# Patient Record
Sex: FEMALE | Race: WHITE | Employment: FULL TIME | URBAN - METROPOLITAN AREA
[De-identification: names, ages, dates, MRNs, and addresses within clinical notes are randomized per-mention and may not be internally consistent; named-entity substitution may affect disease eponyms.]

---

## 2023-10-30 ENCOUNTER — HOSPITAL ENCOUNTER (OUTPATIENT)
Facility: HOSPITAL | Age: 28
Setting detail: OBSERVATION
Discharge: HOME | End: 2023-10-31
Attending: EMERGENCY MEDICINE | Admitting: OBSTETRICS & GYNECOLOGY
Payer: COMMERCIAL

## 2023-10-30 ENCOUNTER — APPOINTMENT (OUTPATIENT)
Facility: HOSPITAL | Age: 28
End: 2023-10-30
Payer: COMMERCIAL

## 2023-10-30 ENCOUNTER — ANESTHESIA EVENT (OUTPATIENT)
Facility: HOSPITAL | Age: 28
End: 2023-10-30
Payer: COMMERCIAL

## 2023-10-30 ENCOUNTER — ANESTHESIA (OUTPATIENT)
Facility: HOSPITAL | Age: 28
End: 2023-10-30
Payer: COMMERCIAL

## 2023-10-30 VITALS
SYSTOLIC BLOOD PRESSURE: 115 MMHG | HEART RATE: 72 BPM | WEIGHT: 162.48 LBS | RESPIRATION RATE: 12 BRPM | BODY MASS INDEX: 23.26 KG/M2 | OXYGEN SATURATION: 100 % | HEIGHT: 70 IN | TEMPERATURE: 97.8 F | DIASTOLIC BLOOD PRESSURE: 75 MMHG

## 2023-10-30 DIAGNOSIS — R10.32 LEFT LOWER QUADRANT ABDOMINAL PAIN: ICD-10-CM

## 2023-10-30 DIAGNOSIS — N83.512 TORSION OF LEFT OVARY: Primary | ICD-10-CM

## 2023-10-30 DIAGNOSIS — Z98.890 STATUS POST LAPAROSCOPY: ICD-10-CM

## 2023-10-30 LAB
ALBUMIN SERPL-MCNC: 4.3 G/DL (ref 3.5–5)
ALBUMIN/GLOB SERPL: 1.3 (ref 1.1–2.2)
ALP SERPL-CCNC: 50 U/L (ref 45–117)
ALT SERPL-CCNC: 16 U/L (ref 12–78)
ANION GAP SERPL CALC-SCNC: 7 MMOL/L (ref 5–15)
APPEARANCE UR: CLEAR
AST SERPL-CCNC: 14 U/L (ref 15–37)
BACTERIA URNS QL MICRO: NEGATIVE /HPF
BASOPHILS # BLD: 0 K/UL (ref 0–0.1)
BASOPHILS NFR BLD: 0 % (ref 0–1)
BILIRUB SERPL-MCNC: 0.6 MG/DL (ref 0.2–1)
BILIRUB UR QL: NEGATIVE
BUN SERPL-MCNC: 15 MG/DL (ref 6–20)
BUN/CREAT SERPL: 15 (ref 12–20)
CALCIUM SERPL-MCNC: 9 MG/DL (ref 8.5–10.1)
CHLORIDE SERPL-SCNC: 108 MMOL/L (ref 97–108)
CO2 SERPL-SCNC: 20 MMOL/L (ref 21–32)
COLOR UR: ABNORMAL
COMMENT:: NORMAL
CREAT SERPL-MCNC: 1.02 MG/DL (ref 0.55–1.02)
DIFFERENTIAL METHOD BLD: NORMAL
EOSINOPHIL # BLD: 0.1 K/UL (ref 0–0.4)
EOSINOPHIL NFR BLD: 1 % (ref 0–7)
EPITH CASTS URNS QL MICRO: ABNORMAL /LPF
ERYTHROCYTE [DISTWIDTH] IN BLOOD BY AUTOMATED COUNT: 11.8 % (ref 11.5–14.5)
GLOBULIN SER CALC-MCNC: 3.3 G/DL (ref 2–4)
GLUCOSE SERPL-MCNC: 122 MG/DL (ref 65–100)
GLUCOSE UR STRIP.AUTO-MCNC: NEGATIVE MG/DL
HCG SERPL QL: NEGATIVE
HCG UR QL: NEGATIVE
HCT VFR BLD AUTO: 39.5 % (ref 35–47)
HGB BLD-MCNC: 13.8 G/DL (ref 11.5–16)
HGB UR QL STRIP: ABNORMAL
HYALINE CASTS URNS QL MICRO: ABNORMAL /LPF (ref 0–5)
IMM GRANULOCYTES # BLD AUTO: 0 K/UL (ref 0–0.04)
IMM GRANULOCYTES NFR BLD AUTO: 0 % (ref 0–0.5)
KETONES UR QL STRIP.AUTO: ABNORMAL MG/DL
LEUKOCYTE ESTERASE UR QL STRIP.AUTO: NEGATIVE
LIPASE SERPL-CCNC: 40 U/L (ref 13–75)
LYMPHOCYTES # BLD: 2.7 K/UL (ref 0.8–3.5)
LYMPHOCYTES NFR BLD: 26 % (ref 12–49)
MCH RBC QN AUTO: 29.9 PG (ref 26–34)
MCHC RBC AUTO-ENTMCNC: 34.9 G/DL (ref 30–36.5)
MCV RBC AUTO: 85.7 FL (ref 80–99)
MONOCYTES # BLD: 0.6 K/UL (ref 0–1)
MONOCYTES NFR BLD: 6 % (ref 5–13)
NEUTS SEG # BLD: 7.2 K/UL (ref 1.8–8)
NEUTS SEG NFR BLD: 67 % (ref 32–75)
NITRITE UR QL STRIP.AUTO: NEGATIVE
NRBC # BLD: 0 K/UL (ref 0–0.01)
NRBC BLD-RTO: 0 PER 100 WBC
PH UR STRIP: 5.5 (ref 5–8)
PLATELET # BLD AUTO: 237 K/UL (ref 150–400)
PMV BLD AUTO: 9.4 FL (ref 8.9–12.9)
POTASSIUM SERPL-SCNC: 3.1 MMOL/L (ref 3.5–5.1)
PROT SERPL-MCNC: 7.6 G/DL (ref 6.4–8.2)
PROT UR STRIP-MCNC: NEGATIVE MG/DL
RBC # BLD AUTO: 4.61 M/UL (ref 3.8–5.2)
RBC #/AREA URNS HPF: ABNORMAL /HPF (ref 0–5)
SODIUM SERPL-SCNC: 135 MMOL/L (ref 136–145)
SP GR UR REFRACTOMETRY: 1.02 (ref 1–1.03)
SPECIMEN HOLD: NORMAL
SPECIMEN HOLD: NORMAL
UROBILINOGEN UR QL STRIP.AUTO: 0.2 EU/DL (ref 0.2–1)
WBC # BLD AUTO: 10.6 K/UL (ref 3.6–11)
WBC URNS QL MICRO: ABNORMAL /HPF (ref 0–4)

## 2023-10-30 PROCEDURE — 80053 COMPREHEN METABOLIC PANEL: CPT

## 2023-10-30 PROCEDURE — 7100000001 HC PACU RECOVERY - ADDTL 15 MIN: Performed by: OBSTETRICS & GYNECOLOGY

## 2023-10-30 PROCEDURE — 96374 THER/PROPH/DIAG INJ IV PUSH: CPT

## 2023-10-30 PROCEDURE — G0378 HOSPITAL OBSERVATION PER HR: HCPCS

## 2023-10-30 PROCEDURE — 83690 ASSAY OF LIPASE: CPT

## 2023-10-30 PROCEDURE — 96375 TX/PRO/DX INJ NEW DRUG ADDON: CPT

## 2023-10-30 PROCEDURE — 36415 COLL VENOUS BLD VENIPUNCTURE: CPT

## 2023-10-30 PROCEDURE — 81001 URINALYSIS AUTO W/SCOPE: CPT

## 2023-10-30 PROCEDURE — 6360000002 HC RX W HCPCS: Performed by: STUDENT IN AN ORGANIZED HEALTH CARE EDUCATION/TRAINING PROGRAM

## 2023-10-30 PROCEDURE — 6360000002 HC RX W HCPCS

## 2023-10-30 PROCEDURE — 6360000002 HC RX W HCPCS: Performed by: NURSE ANESTHETIST, CERTIFIED REGISTERED

## 2023-10-30 PROCEDURE — 2709999900 HC NON-CHARGEABLE SUPPLY: Performed by: OBSTETRICS & GYNECOLOGY

## 2023-10-30 PROCEDURE — 2500000003 HC RX 250 WO HCPCS: Performed by: NURSE ANESTHETIST, CERTIFIED REGISTERED

## 2023-10-30 PROCEDURE — 81025 URINE PREGNANCY TEST: CPT

## 2023-10-30 PROCEDURE — 6360000002 HC RX W HCPCS: Performed by: OBSTETRICS & GYNECOLOGY

## 2023-10-30 PROCEDURE — 99285 EMERGENCY DEPT VISIT HI MDM: CPT

## 2023-10-30 PROCEDURE — 7100000000 HC PACU RECOVERY - FIRST 15 MIN: Performed by: OBSTETRICS & GYNECOLOGY

## 2023-10-30 PROCEDURE — 84703 CHORIONIC GONADOTROPIN ASSAY: CPT

## 2023-10-30 PROCEDURE — 85025 COMPLETE CBC W/AUTO DIFF WBC: CPT

## 2023-10-30 PROCEDURE — 3700000000 HC ANESTHESIA ATTENDED CARE: Performed by: OBSTETRICS & GYNECOLOGY

## 2023-10-30 PROCEDURE — 3700000001 HC ADD 15 MINUTES (ANESTHESIA): Performed by: OBSTETRICS & GYNECOLOGY

## 2023-10-30 PROCEDURE — 3600000014 HC SURGERY LEVEL 4 ADDTL 15MIN: Performed by: OBSTETRICS & GYNECOLOGY

## 2023-10-30 PROCEDURE — 2580000003 HC RX 258: Performed by: OBSTETRICS & GYNECOLOGY

## 2023-10-30 PROCEDURE — 76830 TRANSVAGINAL US NON-OB: CPT

## 2023-10-30 PROCEDURE — 76856 US EXAM PELVIC COMPLETE: CPT

## 2023-10-30 PROCEDURE — 2580000003 HC RX 258: Performed by: NURSE ANESTHETIST, CERTIFIED REGISTERED

## 2023-10-30 PROCEDURE — 3600000004 HC SURGERY LEVEL 4 BASE: Performed by: OBSTETRICS & GYNECOLOGY

## 2023-10-30 RX ORDER — SODIUM CHLORIDE, SODIUM LACTATE, POTASSIUM CHLORIDE, CALCIUM CHLORIDE 600; 310; 30; 20 MG/100ML; MG/100ML; MG/100ML; MG/100ML
INJECTION, SOLUTION INTRAVENOUS CONTINUOUS PRN
Status: DISCONTINUED | OUTPATIENT
Start: 2023-10-30 | End: 2023-10-30 | Stop reason: SDUPTHER

## 2023-10-30 RX ORDER — FENTANYL CITRATE 50 UG/ML
50 INJECTION, SOLUTION INTRAMUSCULAR; INTRAVENOUS
Status: DISCONTINUED | OUTPATIENT
Start: 2023-10-30 | End: 2023-10-30 | Stop reason: HOSPADM

## 2023-10-30 RX ORDER — SODIUM CHLORIDE, SODIUM LACTATE, POTASSIUM CHLORIDE, CALCIUM CHLORIDE 600; 310; 30; 20 MG/100ML; MG/100ML; MG/100ML; MG/100ML
INJECTION, SOLUTION INTRAVENOUS CONTINUOUS
Status: DISCONTINUED | OUTPATIENT
Start: 2023-10-30 | End: 2023-10-30 | Stop reason: HOSPADM

## 2023-10-30 RX ORDER — SODIUM CHLORIDE 0.9 % (FLUSH) 0.9 %
5-40 SYRINGE (ML) INJECTION EVERY 12 HOURS SCHEDULED
Status: DISCONTINUED | OUTPATIENT
Start: 2023-10-30 | End: 2023-11-01 | Stop reason: HOSPADM

## 2023-10-30 RX ORDER — FENTANYL CITRATE 50 UG/ML
INJECTION, SOLUTION INTRAMUSCULAR; INTRAVENOUS PRN
Status: DISCONTINUED | OUTPATIENT
Start: 2023-10-30 | End: 2023-10-30 | Stop reason: SDUPTHER

## 2023-10-30 RX ORDER — SODIUM CHLORIDE 9 MG/ML
INJECTION, SOLUTION INTRAVENOUS PRN
Status: DISCONTINUED | OUTPATIENT
Start: 2023-10-30 | End: 2023-11-01 | Stop reason: HOSPADM

## 2023-10-30 RX ORDER — LABETALOL HYDROCHLORIDE 5 MG/ML
10 INJECTION, SOLUTION INTRAVENOUS
Status: DISCONTINUED | OUTPATIENT
Start: 2023-10-30 | End: 2023-11-01 | Stop reason: HOSPADM

## 2023-10-30 RX ORDER — MEPERIDINE HYDROCHLORIDE 25 MG/ML
12.5 INJECTION INTRAMUSCULAR; INTRAVENOUS; SUBCUTANEOUS EVERY 5 MIN PRN
Status: DISCONTINUED | OUTPATIENT
Start: 2023-10-30 | End: 2023-11-01 | Stop reason: HOSPADM

## 2023-10-30 RX ORDER — BUPIVACAINE HYDROCHLORIDE 5 MG/ML
INJECTION, SOLUTION EPIDURAL; INTRACAUDAL PRN
Status: DISCONTINUED | OUTPATIENT
Start: 2023-10-30 | End: 2023-10-30 | Stop reason: HOSPADM

## 2023-10-30 RX ORDER — SODIUM CHLORIDE 0.9 % (FLUSH) 0.9 %
5-40 SYRINGE (ML) INJECTION EVERY 12 HOURS SCHEDULED
Status: DISCONTINUED | OUTPATIENT
Start: 2023-10-30 | End: 2023-10-30 | Stop reason: HOSPADM

## 2023-10-30 RX ORDER — LANOLIN ALCOHOL/MO/W.PET/CERES
300 CREAM (GRAM) TOPICAL DAILY
COMMUNITY

## 2023-10-30 RX ORDER — SODIUM CHLORIDE 0.9 % (FLUSH) 0.9 %
5-40 SYRINGE (ML) INJECTION PRN
Status: DISCONTINUED | OUTPATIENT
Start: 2023-10-30 | End: 2023-11-01 | Stop reason: HOSPADM

## 2023-10-30 RX ORDER — OXYCODONE HYDROCHLORIDE 5 MG/1
5 TABLET ORAL PRN
Status: ACTIVE | OUTPATIENT
Start: 2023-10-30 | End: 2023-10-30

## 2023-10-30 RX ORDER — SUCCINYLCHOLINE/SOD CL,ISO/PF 200MG/10ML
SYRINGE (ML) INTRAVENOUS PRN
Status: DISCONTINUED | OUTPATIENT
Start: 2023-10-30 | End: 2023-10-30 | Stop reason: SDUPTHER

## 2023-10-30 RX ORDER — LIDOCAINE HYDROCHLORIDE 20 MG/ML
INJECTION, SOLUTION EPIDURAL; INFILTRATION; INTRACAUDAL; PERINEURAL PRN
Status: DISCONTINUED | OUTPATIENT
Start: 2023-10-30 | End: 2023-10-30 | Stop reason: SDUPTHER

## 2023-10-30 RX ORDER — IBUPROFEN 800 MG/1
800 TABLET ORAL EVERY 8 HOURS PRN
Qty: 30 TABLET | Refills: 1 | Status: SHIPPED | OUTPATIENT
Start: 2023-10-30

## 2023-10-30 RX ORDER — MIDAZOLAM HYDROCHLORIDE 2 MG/2ML
2 INJECTION, SOLUTION INTRAMUSCULAR; INTRAVENOUS
Status: DISCONTINUED | OUTPATIENT
Start: 2023-10-30 | End: 2023-10-30 | Stop reason: HOSPADM

## 2023-10-30 RX ORDER — OXYCODONE HYDROCHLORIDE AND ACETAMINOPHEN 5; 325 MG/1; MG/1
1 TABLET ORAL EVERY 6 HOURS PRN
Qty: 28 TABLET | Refills: 0 | Status: SHIPPED | OUTPATIENT
Start: 2023-10-30 | End: 2023-11-06

## 2023-10-30 RX ORDER — ALBUTEROL SULFATE 2.5 MG/3ML
2.5 SOLUTION RESPIRATORY (INHALATION)
Status: DISCONTINUED | OUTPATIENT
Start: 2023-10-30 | End: 2023-10-30 | Stop reason: HOSPADM

## 2023-10-30 RX ORDER — ONDANSETRON 2 MG/ML
4 INJECTION INTRAMUSCULAR; INTRAVENOUS
Status: ACTIVE | OUTPATIENT
Start: 2023-10-30 | End: 2023-10-31

## 2023-10-30 RX ORDER — HYDROMORPHONE HYDROCHLORIDE 1 MG/ML
0.5 INJECTION, SOLUTION INTRAMUSCULAR; INTRAVENOUS; SUBCUTANEOUS ONCE
Status: COMPLETED | OUTPATIENT
Start: 2023-10-30 | End: 2023-10-30

## 2023-10-30 RX ORDER — PROCHLORPERAZINE EDISYLATE 5 MG/ML
5 INJECTION INTRAMUSCULAR; INTRAVENOUS
Status: ACTIVE | OUTPATIENT
Start: 2023-10-30 | End: 2023-10-31

## 2023-10-30 RX ORDER — OXYCODONE HYDROCHLORIDE 5 MG/1
10 TABLET ORAL PRN
Status: ACTIVE | OUTPATIENT
Start: 2023-10-30 | End: 2023-10-30

## 2023-10-30 RX ORDER — KETOROLAC TROMETHAMINE 30 MG/ML
INJECTION, SOLUTION INTRAMUSCULAR; INTRAVENOUS PRN
Status: DISCONTINUED | OUTPATIENT
Start: 2023-10-30 | End: 2023-10-30 | Stop reason: SDUPTHER

## 2023-10-30 RX ORDER — ROCURONIUM BROMIDE 10 MG/ML
INJECTION, SOLUTION INTRAVENOUS PRN
Status: DISCONTINUED | OUTPATIENT
Start: 2023-10-30 | End: 2023-10-30 | Stop reason: SDUPTHER

## 2023-10-30 RX ORDER — FENTANYL CITRATE 50 UG/ML
25 INJECTION, SOLUTION INTRAMUSCULAR; INTRAVENOUS
Status: DISCONTINUED | OUTPATIENT
Start: 2023-10-30 | End: 2023-10-30 | Stop reason: HOSPADM

## 2023-10-30 RX ORDER — HYDROMORPHONE HYDROCHLORIDE 1 MG/ML
0.25 INJECTION, SOLUTION INTRAMUSCULAR; INTRAVENOUS; SUBCUTANEOUS EVERY 5 MIN PRN
Status: DISCONTINUED | OUTPATIENT
Start: 2023-10-30 | End: 2023-11-01 | Stop reason: HOSPADM

## 2023-10-30 RX ORDER — ONDANSETRON 2 MG/ML
INJECTION INTRAMUSCULAR; INTRAVENOUS PRN
Status: DISCONTINUED | OUTPATIENT
Start: 2023-10-30 | End: 2023-10-30 | Stop reason: SDUPTHER

## 2023-10-30 RX ORDER — DEXAMETHASONE SODIUM PHOSPHATE 4 MG/ML
INJECTION, SOLUTION INTRA-ARTICULAR; INTRALESIONAL; INTRAMUSCULAR; INTRAVENOUS; SOFT TISSUE PRN
Status: DISCONTINUED | OUTPATIENT
Start: 2023-10-30 | End: 2023-10-30 | Stop reason: SDUPTHER

## 2023-10-30 RX ORDER — HYDRALAZINE HYDROCHLORIDE 20 MG/ML
10 INJECTION INTRAMUSCULAR; INTRAVENOUS
Status: DISCONTINUED | OUTPATIENT
Start: 2023-10-30 | End: 2023-11-01 | Stop reason: HOSPADM

## 2023-10-30 RX ORDER — SODIUM CHLORIDE, SODIUM LACTATE, POTASSIUM CHLORIDE, CALCIUM CHLORIDE 600; 310; 30; 20 MG/100ML; MG/100ML; MG/100ML; MG/100ML
INJECTION, SOLUTION INTRAVENOUS CONTINUOUS
Status: DISCONTINUED | OUTPATIENT
Start: 2023-10-30 | End: 2023-11-01 | Stop reason: HOSPADM

## 2023-10-30 RX ORDER — DIPHENHYDRAMINE HYDROCHLORIDE 50 MG/ML
12.5 INJECTION INTRAMUSCULAR; INTRAVENOUS
Status: ACTIVE | OUTPATIENT
Start: 2023-10-30 | End: 2023-10-31

## 2023-10-30 RX ORDER — MIDAZOLAM HYDROCHLORIDE 1 MG/ML
INJECTION INTRAMUSCULAR; INTRAVENOUS PRN
Status: DISCONTINUED | OUTPATIENT
Start: 2023-10-30 | End: 2023-10-30 | Stop reason: SDUPTHER

## 2023-10-30 RX ORDER — SODIUM CHLORIDE 0.9 % (FLUSH) 0.9 %
5-40 SYRINGE (ML) INJECTION PRN
Status: DISCONTINUED | OUTPATIENT
Start: 2023-10-30 | End: 2023-10-30 | Stop reason: HOSPADM

## 2023-10-30 RX ORDER — SODIUM CHLORIDE 9 MG/ML
INJECTION, SOLUTION INTRAVENOUS PRN
Status: DISCONTINUED | OUTPATIENT
Start: 2023-10-30 | End: 2023-10-30 | Stop reason: HOSPADM

## 2023-10-30 RX ORDER — LIDOCAINE HYDROCHLORIDE 10 MG/ML
1 INJECTION, SOLUTION EPIDURAL; INFILTRATION; INTRACAUDAL; PERINEURAL
Status: DISCONTINUED | OUTPATIENT
Start: 2023-10-30 | End: 2023-10-30 | Stop reason: HOSPADM

## 2023-10-30 RX ORDER — FENTANYL CITRATE 50 UG/ML
50 INJECTION, SOLUTION INTRAMUSCULAR; INTRAVENOUS EVERY 5 MIN PRN
Status: COMPLETED | OUTPATIENT
Start: 2023-10-30 | End: 2023-10-30

## 2023-10-30 RX ORDER — LORAZEPAM 2 MG/ML
0.5 INJECTION INTRAMUSCULAR
Status: ACTIVE | OUTPATIENT
Start: 2023-10-30 | End: 2023-10-31

## 2023-10-30 RX ORDER — MORPHINE SULFATE 4 MG/ML
4 INJECTION, SOLUTION INTRAMUSCULAR; INTRAVENOUS
Status: COMPLETED | OUTPATIENT
Start: 2023-10-30 | End: 2023-10-30

## 2023-10-30 RX ADMIN — MORPHINE SULFATE 4 MG: 4 INJECTION, SOLUTION INTRAMUSCULAR; INTRAVENOUS at 16:49

## 2023-10-30 RX ADMIN — HYDROMORPHONE HYDROCHLORIDE 0.5 MG: 1 INJECTION, SOLUTION INTRAMUSCULAR; INTRAVENOUS; SUBCUTANEOUS at 18:32

## 2023-10-30 RX ADMIN — ROCURONIUM BROMIDE 30 MG: 10 SOLUTION INTRAVENOUS at 19:52

## 2023-10-30 RX ADMIN — PROPOFOL 200 MG: 10 INJECTION, EMULSION INTRAVENOUS at 19:32

## 2023-10-30 RX ADMIN — SODIUM CHLORIDE, POTASSIUM CHLORIDE, SODIUM LACTATE AND CALCIUM CHLORIDE: 600; 310; 30; 20 INJECTION, SOLUTION INTRAVENOUS at 19:19

## 2023-10-30 RX ADMIN — SODIUM CHLORIDE, POTASSIUM CHLORIDE, SODIUM LACTATE AND CALCIUM CHLORIDE: 600; 310; 30; 20 INJECTION, SOLUTION INTRAVENOUS at 19:26

## 2023-10-30 RX ADMIN — SUGAMMADEX 200 MG: 100 INJECTION, SOLUTION INTRAVENOUS at 20:21

## 2023-10-30 RX ADMIN — FENTANYL CITRATE 50 MCG: 50 INJECTION, SOLUTION INTRAMUSCULAR; INTRAVENOUS at 19:56

## 2023-10-30 RX ADMIN — ONDANSETRON HYDROCHLORIDE 4 MG: 2 INJECTION, SOLUTION INTRAMUSCULAR; INTRAVENOUS at 19:37

## 2023-10-30 RX ADMIN — PROPOFOL 50 MG: 10 INJECTION, EMULSION INTRAVENOUS at 20:31

## 2023-10-30 RX ADMIN — KETOROLAC TROMETHAMINE 30 MG: 30 INJECTION, SOLUTION INTRAMUSCULAR at 20:19

## 2023-10-30 RX ADMIN — Medication 160 MG: at 19:32

## 2023-10-30 RX ADMIN — FENTANYL CITRATE 50 MCG: 50 INJECTION INTRAMUSCULAR; INTRAVENOUS at 20:52

## 2023-10-30 RX ADMIN — DEXAMETHASONE SODIUM PHOSPHATE 4 MG: 4 INJECTION, SOLUTION INTRAMUSCULAR; INTRAVENOUS at 19:37

## 2023-10-30 RX ADMIN — FENTANYL CITRATE 50 MCG: 50 INJECTION, SOLUTION INTRAMUSCULAR; INTRAVENOUS at 19:32

## 2023-10-30 RX ADMIN — ROCURONIUM BROMIDE 10 MG: 10 SOLUTION INTRAVENOUS at 19:32

## 2023-10-30 RX ADMIN — FENTANYL CITRATE 25 MCG: 50 INJECTION INTRAMUSCULAR; INTRAVENOUS at 21:08

## 2023-10-30 RX ADMIN — LIDOCAINE HYDROCHLORIDE 100 MG: 20 INJECTION, SOLUTION EPIDURAL; INFILTRATION; INTRACAUDAL; PERINEURAL at 19:32

## 2023-10-30 RX ADMIN — MIDAZOLAM 2 MG: 1 INJECTION INTRAMUSCULAR; INTRAVENOUS at 19:28

## 2023-10-30 ASSESSMENT — PAIN DESCRIPTION - ORIENTATION
ORIENTATION: LEFT
ORIENTATION: ANTERIOR
ORIENTATION: ANTERIOR

## 2023-10-30 ASSESSMENT — PAIN DESCRIPTION - LOCATION
LOCATION: ABDOMEN

## 2023-10-30 ASSESSMENT — PAIN - FUNCTIONAL ASSESSMENT
PAIN_FUNCTIONAL_ASSESSMENT: 0-10
PAIN_FUNCTIONAL_ASSESSMENT: 0-10

## 2023-10-30 ASSESSMENT — PAIN DESCRIPTION - DESCRIPTORS: DESCRIPTORS: CRAMPING

## 2023-10-30 ASSESSMENT — PAIN SCALES - GENERAL
PAINLEVEL_OUTOF10: 0
PAINLEVEL_OUTOF10: 8
PAINLEVEL_OUTOF10: 5
PAINLEVEL_OUTOF10: 10
PAINLEVEL_OUTOF10: 6

## 2023-10-30 NOTE — PROGRESS NOTES
OB/Gyn clinical note    Met patient and reviewed clinical history. As indicated in Dr. Gladis Pinzon H+P, Ms. Samuel Jerome is a 35 y/o G0 who has a suspected left ovarian torsion given sudden onset of severe left lower quadrant pain and ultrasound demonstrating absence of flow to the left ovary. She reports that, since receiving morphine, she feels a bit better. 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 Labs and images reviewed. A: G0, significant LLQ pain, absence of flow to left ovary on ultrasound, suspect left ovarian torsion. P: Laparoscopic correction of left ovarian torsion, possible ovarian cystectomy, possible oophorectomy, possible laparotomy. Discussed risks of this procedure including but not limited to bleeding, infection, and damage to the surrounding intraabdominal anatomy that may require further corrective surgery. Also discussed that, if the ovary does not appear to be viable after torsion is corrected, oophorectomy may become necessary. Patient verbalizes her understanding and consents to proceed.       Cecelia Huerta DO FACOG  10/30/23

## 2023-10-31 PROCEDURE — G0378 HOSPITAL OBSERVATION PER HR: HCPCS

## 2023-10-31 NOTE — PROGRESS NOTES
Patient received Fentanyl 50 mcg per order. However, she is still complaining of level six pain. Spoke to Dr. Hector Norwood, will give Fentanyl 25 mcg now and then if she needs will give the other 25 mcg. Verified 24 hour pharm with patient and discussed with provider.

## 2023-10-31 NOTE — DISCHARGE SUMMARY
Physician Discharge Summary     Patient ID:  Nadia Hung  [id number]  56 y.o.  1995    Admit date: 10/30/2023    Discharge date and time: 10/30/23    Admitting Physician: Pippa Rush MD     Discharge Physician: Sarah Lefort, DO    Admission Diagnoses: Suspected torsion of left ovary    Discharge Diagnoses: Left lower quadrant pain, normal pelvic anatomy    Admission Condition: fair    Discharged Condition: good    Indication for Admission: Left lower quadrant abdominal pain    Hospital Course: Seen in ER for left lower quadrant abdominal pain. Ultrasound findings significant for lack of blood flow to left ovary. No ovarian cyst.  Patient underwent diagnostic laparoscopy which was unremarkable. No ovarian torsion was noted and normal pelvic anatomy was noted. See operative note. Patient had an uneventful post operative course and was discharged home on the same day as her admission. Consults: gynecology        Outstanding Order Results       No orders found from 10/1/2023 to 10/31/2023. Treatments: Diagnostic laparoscopy    Disposition: home    Patient Instructions:   [unfilled]  Activity: activity as tolerated, ambulate in house, and no driving while on analgesics  Diet: regular diet  Wound Care: May shower, keep incisions clean. Some vaginal soreness and vaginal bleeding is normal after this procedure    Follow-up with Dr. Sarah Lefort in 2 weeks.     Signed:  Sarah Lefort, DO  10/30/2023  9:14 PM

## 2023-10-31 NOTE — OP NOTE
GYNECOLOGY OPERATIVE NOTE    Date of Procedure: 10/30/23    Preoperative Diagnosis: Suspected left ovarian torsion    Postoperative Diagnosis: Normal pelvic anatomy     Procedure: Procedure(s):  LAPAROSCOPY DIAGNOSTIC    Surgeon: Yana Dorantes DO    Assistant(s): Jonel Franco on duty    Anesthesia: General, local for laparoscopic port site incisions    Estimated Blood Loss: 5mL    Specimens: * No specimens in log *     UOP: 250mL clear    Antibiotics: none indicated    Findings:   IUD strings visualized  Normal appearing uterus, bilateral fallopian tubes and ovaries  Normal appearing appendix  Normal hemostasis    Complications: None    Counts: Correct    Procedure:  After informed consent was obtained and signed, the patient was taken to the operating room where she was placed in the dorsal supine position. General anesthesia was administered and her airway was secured without difficulty. She was then repositioned into the dorsal lithotomy position with legs in [address] [place]. Bilateral arms were tucked at her sides. SCDs were functional bilaterally. She was prepped and draped in the usual sterile manner for abdominopelvic laparoscopic surgery. A timeout was performed wherein the patient's identity, procedure to be performed, and any outlying risks were addressed. Once all were in agreement, the procedure was begun. A speculum was placed in her vagina and the cervix was brought easily into view. IUD strings were visualized. A single toothed tenaculum was placed on the anterior lip of the cervix. The acorn uterine manipulator was advanced gently through her cervix and affixed to the tenaculum per product guidelines. The speculum was removed. The webber catheter was placed for return of clear urine. The 's gloves were changed and attention was turned to the abdomen where a 5mm supraumbilical incision was made with the knife.   While tenting up the anterior abdominal wall and using the optiview trocar,

## 2023-10-31 NOTE — DISCHARGE INSTR - DIET

## 2024-06-27 ENCOUNTER — HOSPITAL ENCOUNTER (OUTPATIENT)
Dept: PHYSICAL THERAPY | Facility: HOSPITAL | Age: 29
Setting detail: RECURRING SERIES
Discharge: HOME | End: 2024-06-30
Payer: COMMERCIAL

## 2024-06-27 PROCEDURE — 97110 THERAPEUTIC EXERCISES: CPT | Performed by: PHYSICAL MEDICINE & REHABILITATION

## 2024-06-27 PROCEDURE — G0283 ELEC STIM OTHER THAN WOUND: HCPCS | Performed by: PHYSICAL MEDICINE & REHABILITATION

## 2024-06-27 PROCEDURE — 97161 PT EVAL LOW COMPLEX 20 MIN: CPT | Performed by: PHYSICAL MEDICINE & REHABILITATION

## 2024-06-27 NOTE — THERAPY EVALUATION
Physical Therapy at [facility],   a part of [address], [address]  [place] [address]  Phone: [phone]  Fax: [phone]           PHYSICAL THERAPY - EVALUATION/PLAN OF CARE NOTE (updated 3/23)      Date: 2024          Patient Name:  Elizabeth Love :  1995   Medical   Diagnosis:  Low back pain [M54.50] Treatment Diagnosis:  M54.59, G89.29  CHRONIC LOWER BACK PAIN    Referral Source:  Referral, Self Provider #:  [id number]                Insurance: Payor: [facility] / Plan: [employer] [facility] EMPLOYEES / Product Type: *No Product type* /      Patient  verified yes     Visit #   Current  / Total 1 20   Time   In / Out 9:40A 10:32A   Total Treatment Time 52   Total Timed Codes 10         SUBJECTIVE  Pain Level (0-10 scale): 4  []constant [x]intermittent []improving [x]worsening []no change since onset    Any medication changes, allergies to medications, adverse drug reactions, diagnosis change, or new procedure performed?: [x] No    [] Yes (see summary sheet for update)  Medications: Verified on Patient Summary List    Subjective functional status/changes:     Pt is seen via direct access secondary to chronic low back pain.  Today, Pt reports primary complaints of intermittent low back pain that radiates across bilaterally. \"I feel like I have a weak core.\" Pt denies any numbness/tingling. 4-6/10 back pain on a daily basis. Mechanism of Injury: More pain in past 4-5 months, though has been present since high school. 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     Aggravating factors include prolonged standing, walking.  Relieving factors include bending over, heating pad    PLOF: Burn boot camp 1 month ago 3 days/week; will be returning to gym work-outs 3-4x/week  Previous Treatment/Compliance: None   Work Hx: Nurse in NICU      Pt Goals: Not have this daily pain     PMHx/Surgical Hx/Comorbidites:    has no past medical

## 2024-07-03 ENCOUNTER — HOSPITAL ENCOUNTER (OUTPATIENT)
Dept: PHYSICAL THERAPY | Facility: HOSPITAL | Age: 29
Setting detail: RECURRING SERIES
Discharge: HOME | End: 2024-07-06
Payer: COMMERCIAL

## 2024-07-03 PROCEDURE — 97032 APPL MODALITY 1+ESTIM EA 15: CPT | Performed by: PHYSICAL MEDICINE & REHABILITATION

## 2024-07-03 PROCEDURE — 97112 NEUROMUSCULAR REEDUCATION: CPT | Performed by: PHYSICAL MEDICINE & REHABILITATION

## 2024-07-03 PROCEDURE — 97110 THERAPEUTIC EXERCISES: CPT | Performed by: PHYSICAL MEDICINE & REHABILITATION

## 2024-07-03 PROCEDURE — G0283 ELEC STIM OTHER THAN WOUND: HCPCS | Performed by: PHYSICAL MEDICINE & REHABILITATION

## 2024-07-03 PROCEDURE — 20560 NDL INSJ W/O NJX 1 OR 2 MUSC: CPT | Performed by: PHYSICAL MEDICINE & REHABILITATION

## 2024-07-03 NOTE — PROGRESS NOTES
PHYSICAL THERAPY - DAILY TREATMENT NOTE (updated 3/23)      Date: 7/3/2024          Patient Name:  Elizabeth Love :  1995   Medical   Diagnosis:  Low back pain [M54.50] Treatment Diagnosis:  M54.59, G89.29  CHRONIC LOWER BACK PAIN    Referral Source:  Referral, Self Insurance:   Payor: [facility] / Plan: [employer] EMPLOYEES / Product Type: *No Product type* /                     Patient  verified yes     Visit #   Current  / Total 2 20   Time   In / Out 10:01A 11:07A   Total Treatment Time 66   Total Timed Codes 48         SUBJECTIVE    Pain Level (0-10 scale): 4-5    Any medication changes, allergies to medications, adverse drug reactions, diagnosis change, or new procedure performed?: [x] No    [] Yes (see summary sheet for update)  Medications: Verified on Patient Summary List    Subjective functional status/changes:     Pt reports she has worked for the last 5 days and has been more sore across bilateral low back, which increases the longer she is on her feet.     OBJECTIVE    Therapeutic Procedures:  Tx Min Billable or 1:1 Min (if diff from Tx Min) Procedure, Rationale, Specifics   15  13965 Therapeutic Exercise (timed):  increase ROM, strength, coordination, balance, and proprioception to improve patient's ability to progress to PLOF and address remaining functional goals. (see flow sheet as applicable)     Details if applicable:  assessment and re-assessment   25  85832 Neuromuscular Re-Education (timed):  improve balance, coordination, kinesthetic sense, posture, core stability and proprioception to improve patient's ability to develop conscious control of individual muscles and awareness of position of extremities in order to progress to PLOF and address remaining functional goals. (see flow sheet as applicable)     Details if applicable:  glut set, standing hip abduction with cueing for posture and technique, PPT with and without marches utilizing biopressure feedback                  40     Total

## 2024-07-11 ENCOUNTER — HOSPITAL ENCOUNTER (OUTPATIENT)
Dept: PHYSICAL THERAPY | Facility: HOSPITAL | Age: 29
Setting detail: RECURRING SERIES
Discharge: HOME | End: 2024-07-14
Payer: COMMERCIAL

## 2024-07-11 PROCEDURE — 97112 NEUROMUSCULAR REEDUCATION: CPT | Performed by: PHYSICAL MEDICINE & REHABILITATION

## 2024-07-11 PROCEDURE — 20560 NDL INSJ W/O NJX 1 OR 2 MUSC: CPT | Performed by: PHYSICAL MEDICINE & REHABILITATION

## 2024-07-11 PROCEDURE — 97016 VASOPNEUMATIC DEVICE THERAPY: CPT | Performed by: PHYSICAL MEDICINE & REHABILITATION

## 2024-07-11 PROCEDURE — 97032 APPL MODALITY 1+ESTIM EA 15: CPT | Performed by: PHYSICAL MEDICINE & REHABILITATION

## 2024-07-11 PROCEDURE — 97110 THERAPEUTIC EXERCISES: CPT | Performed by: PHYSICAL MEDICINE & REHABILITATION

## 2024-07-11 NOTE — PROGRESS NOTES
skilled PT / OT services to modify and progress therapeutic interventions, analyze and address functional mobility deficits, analyze and address ROM deficits, analyze and address strength deficits, analyze and address soft tissue restrictions, analyze and cue for proper movement patterns, analyze and modify for postural abnormalities, and instruct in home and community integration to address functional deficits and attain remaining goals.    Progress toward goals / Updated goals:  []  See Progress Note/Recertification    Short and Long Term Goals: To be accomplished in 20 treatments.  Pt will be independent and compliant with HEP.  Pt will increase her FOTO score by the MCID from 57 to >/= 71 demonstrating improved overall function with decreased pain.  Pt will be able to be able to hold a prone plank >/= 1 minute with neutral spine and without losing form.  Pt will demonstrate >/= 4/5 hip abduction strength bilaterally to facilitate improved standing activity tolerance.  Pt will be able to walk >/= 1 mile without increased back pain.  Pt will be able to complete gym work-outs with </= 2/10 back soreness during or afterwards.    PLAN  Yes  Continue plan of care  Re-Cert Due: 20 visits  [x]  Upgrade activities as tolerated  []  Discharge due to:  []  Other:      HIPOLITO FERRARI, PT       7/11/2024       10:36 AM

## 2024-07-18 ENCOUNTER — HOSPITAL ENCOUNTER (OUTPATIENT)
Dept: PHYSICAL THERAPY | Facility: HOSPITAL | Age: 29
Setting detail: RECURRING SERIES
Discharge: HOME | End: 2024-07-21
Payer: COMMERCIAL

## 2024-07-18 PROCEDURE — 97112 NEUROMUSCULAR REEDUCATION: CPT | Performed by: PHYSICAL MEDICINE & REHABILITATION

## 2024-07-18 PROCEDURE — 97140 MANUAL THERAPY 1/> REGIONS: CPT | Performed by: PHYSICAL MEDICINE & REHABILITATION

## 2024-07-18 PROCEDURE — 97110 THERAPEUTIC EXERCISES: CPT | Performed by: PHYSICAL MEDICINE & REHABILITATION

## 2024-07-18 NOTE — PROGRESS NOTES
PHYSICAL THERAPY - DAILY TREATMENT NOTE (updated 3/23)      Date: 2024          Patient Name:  Elizabeth Love :  1995   Medical   Diagnosis:  Low back pain [M54.50] Treatment Diagnosis:  M54.59, G89.29  CHRONIC LOWER BACK PAIN    Referral Source:  Referral, Self Insurance:   Payor: [facility] / Plan: [facility] EMPLOYEES / Product Type: *No Product type* /                     Patient  verified yes     Visit #   Current  / Total 4 20   Time   In / Out 10:31A 11:20A   Total Treatment Time 49   Total Timed Codes 49         SUBJECTIVE    Pain Level (0-10 scale): 4    Any medication changes, allergies to medications, adverse drug reactions, diagnosis change, or new procedure performed?: [x] No    [] Yes (see summary sheet for update)  Medications: Verified on Patient Summary List    Subjective functional status/changes:     Pt reports she has felt significant improvement over the past week. Much less frequent and intense pain. However, she has noted some R low back pain this morning. She is able to reproduce it by standing and lifting R leg up in march.    OBJECTIVE    Therapeutic Procedures:  Tx Min Billable or 1:1 Min (if diff from Tx Min) Procedure, Rationale, Specifics   29  87884 Therapeutic Exercise (timed):  increase ROM, strength, coordination, balance, and proprioception to improve patient's ability to progress to PLOF and address remaining functional goals. (see flow sheet as applicable)     Details if applicable:  assessment and re-assessment   12  26044 Neuromuscular Re-Education (timed):  improve balance, coordination, kinesthetic sense, posture, core stability and proprioception to improve patient's ability to develop conscious control of individual muscles and awareness of position of extremities in order to progress to PLOF and address remaining functional goals. (see flow sheet as applicable)     Details if applicable:  PPT with and without marches utilizing biopressure feedback; prone and side

## 2024-07-29 ENCOUNTER — HOSPITAL ENCOUNTER (OUTPATIENT)
Dept: PHYSICAL THERAPY | Facility: HOSPITAL | Age: 29
Setting detail: RECURRING SERIES
Discharge: HOME | End: 2024-08-01
Payer: COMMERCIAL

## 2024-07-29 PROCEDURE — G0283 ELEC STIM OTHER THAN WOUND: HCPCS | Performed by: PHYSICAL MEDICINE & REHABILITATION

## 2024-07-29 PROCEDURE — 97112 NEUROMUSCULAR REEDUCATION: CPT | Performed by: PHYSICAL MEDICINE & REHABILITATION

## 2024-07-29 PROCEDURE — 97110 THERAPEUTIC EXERCISES: CPT | Performed by: PHYSICAL MEDICINE & REHABILITATION

## 2024-07-29 NOTE — PROGRESS NOTES
Physical Therapy at [facility],   a part of [address], [address]  [place] [address]  Phone: [phone]  Fax: [phone]      PHYSICAL THERAPY PROGRESS NOTE  Patient Name:  Elizabeth Love :  1995   Treatment/Medical Diagnosis: Low back pain [M54.50]   Referral Source:  Referral, Self     Date of Initial Visit:  24 Attended Visits:  5 Missed Visits:  0     SUMMARY OF TREATMENT/ASSESSMENT:  Ms. Love has been seen for 5 skilled physical therapy visits secondary to chronic low back pain. Pt is demonstrating very good progress with her therapy program thus far and reports feeling pain-free with day to day activities at this point. She demonstrates significantly improved hip abduction strength bilaterally since dry needling of lumbar multifidi and gluteus medius musculature. Pt still lacks endurance of core and gluteal musculature and impaired form with more dynamic activity. She would benefit from additional therapy to further address impairments and facilitate return to full exercise regimen without pain or restriction.     CURRENT STATUS/GOALS:    0-100: 75% improved - really good day to day basis; wants to get back to full exercise regimen without pain  Prone plank: 49\" (40\" on eval)  Therese: 160 bpm --> 168 bpm                                                        Lumbar AROM:                                                                                               R                      L                        Flexion                                                 100%            100%                             Extension                                            50% central LBP      100% tight, no pain                                         Side Bending                           100% L stretch    100%                     100% R stretch     100%                    Rotation                                   100%            
100%                             Extension                                            50% central LBP      100% tight, no pain                                         Side Bending                           100% L stretch    100%                     100% R stretch     100%                    Rotation                                   100%               100%                      MMT:               HIP Abd: R: <3 --> 4+; L: 3 --> 5    Pain Level at end of session (0-10 scale): 2    Assessment  Patient will continue to benefit from skilled PT / OT services to modify and progress therapeutic interventions, analyze and address functional mobility deficits, analyze and address ROM deficits, analyze and address strength deficits, analyze and address soft tissue restrictions, analyze and cue for proper movement patterns, analyze and modify for postural abnormalities, and instruct in home and community integration to address functional deficits and attain remaining goals.    Progress toward goals / Updated goals:  []  See Progress Note/Recertification    Short and Long Term Goals: To be accomplished in 20 treatments.  Pt will be independent and compliant with HEP. - MET  Pt will increase her FOTO score by the MCID from 57 to >/= 71 demonstrating improved overall function with decreased pain. - Progressing  Pt will be able to be able to hold a prone plank >/= 1 minute with neutral spine and without losing form. - Progressing (able to hold modified)  Pt will demonstrate >/= 4/5 hip abduction strength bilaterally to facilitate improved standing activity tolerance. - MET  Pt will be able to walk >/= 1 mile without increased back pain. - Progressing (soreness towards end)  Pt will be able to complete gym work-outs with </= 2/10 back soreness during or afterwards. - Progressing    PLAN  Yes  Continue plan of care  Re-Cert Due: 20 visits  [x]  Upgrade activities as tolerated  []  Discharge due to:  []  Other:      HIPOLITO FERRARI,

## 2024-08-05 ENCOUNTER — HOSPITAL ENCOUNTER (OUTPATIENT)
Dept: PHYSICAL THERAPY | Facility: HOSPITAL | Age: 29
Setting detail: RECURRING SERIES
Discharge: HOME | End: 2024-08-08
Payer: COMMERCIAL

## 2024-08-05 PROCEDURE — 97110 THERAPEUTIC EXERCISES: CPT

## 2024-08-05 PROCEDURE — 97112 NEUROMUSCULAR REEDUCATION: CPT

## 2024-08-05 NOTE — PROGRESS NOTES
PHYSICAL THERAPY - DAILY TREATMENT NOTE (updated 3/23)      Date: 2024          Patient Name:  Elizabeth Love :  1995   Medical   Diagnosis:  Low back pain [M54.50] Treatment Diagnosis:  M54.59, G89.29  CHRONIC LOWER BACK PAIN    Referral Source:  Referral, Self Insurance:   Payor: [facility] / Plan: [employer] EMPLOYEES / Product Type: *No Product type* /                     Patient  verified yes     Visit #   Current  / Total 6 20   Time   In / Out 2:34 P 3:38 P   Total Treatment Time 64   Total Timed Codes 54         SUBJECTIVE    Pain Level (0-10 scale): 1-2/10    Any medication changes, allergies to medications, adverse drug reactions, diagnosis change, or new procedure performed?: [x] No    [] Yes (see summary sheet for update)  Medications: Verified on Patient Summary List    Subjective functional status/changes:     Pt reports was able to golf today w/out any aggravation of sx however notes fatigue     OBJECTIVE    Therapeutic Procedures:  Tx Min Billable or 1:1 Min (if diff from Tx Min) Procedure, Rationale, Specifics   29  09633 Therapeutic Exercise (timed):  increase ROM, strength, coordination, balance, and proprioception to improve patient's ability to progress to PLOF and address remaining functional goals. (see flow sheet as applicable)     Details if applicable:  assessment and re-assessment   25  54587 Neuromuscular Re-Education (timed):  improve balance, coordination, kinesthetic sense, posture, core stability and proprioception to improve patient's ability to develop conscious control of individual muscles and awareness of position of extremities in order to progress to PLOF and address remaining functional goals. (see flow sheet as applicable)     Details if applicable:  SWB plank walk-out; SL Squats with valgus-directed band resistance; SWB bridges; core press                   54     Total Total     Modalities Rationale:     decrease inflammation and decrease pain to improve patient's

## 2024-08-13 ENCOUNTER — HOSPITAL ENCOUNTER (OUTPATIENT)
Dept: PHYSICAL THERAPY | Facility: HOSPITAL | Age: 29
Setting detail: RECURRING SERIES
Discharge: HOME | End: 2024-08-16
Payer: COMMERCIAL

## 2024-08-13 PROCEDURE — 97112 NEUROMUSCULAR REEDUCATION: CPT | Performed by: PHYSICAL MEDICINE & REHABILITATION

## 2024-08-13 PROCEDURE — 20560 NDL INSJ W/O NJX 1 OR 2 MUSC: CPT | Performed by: PHYSICAL MEDICINE & REHABILITATION

## 2024-08-13 PROCEDURE — 97032 APPL MODALITY 1+ESTIM EA 15: CPT | Performed by: PHYSICAL MEDICINE & REHABILITATION

## 2024-08-13 PROCEDURE — 97110 THERAPEUTIC EXERCISES: CPT | Performed by: PHYSICAL MEDICINE & REHABILITATION

## 2024-08-13 NOTE — PROGRESS NOTES
PHYSICAL THERAPY - DAILY TREATMENT NOTE (updated 3/23)      Date: 2024          Patient Name:  Elizabeth Love :  1995   Medical   Diagnosis:  Low back pain [M54.50] Treatment Diagnosis:  M54.59, G89.29  CHRONIC LOWER BACK PAIN    Referral Source:  Referral, Self Insurance:   Payor: [facility] / Plan: [facility] EMPLOYEES / Product Type: *No Product type* /                     Patient  verified yes     Visit #   Current  / Total 7 20   Time   In / Out 10:30A 11:30P   Total Treatment Time 60   Total Timed Codes 48         SUBJECTIVE    Pain Level (0-10 scale): 3/10    Any medication changes, allergies to medications, adverse drug reactions, diagnosis change, or new procedure performed?: [x] No    [] Yes (see summary sheet for update)  Medications: Verified on Patient Summary List    Subjective functional status/changes:     Pt reports she feels like she regressed over the past week and has more of a constant pain within lumbar paraspinal region bilaterally. She was more active this past week and was not able to really do her exercises. She golfed last week which felt fine while playing with some soreness the next day.     OBJECTIVE    Therapeutic Procedures:  Tx Min Billable or 1:1 Min (if diff from Tx Min) Procedure, Rationale, Specifics   17  32577 Therapeutic Exercise (timed):  increase ROM, strength, coordination, balance, and proprioception to improve patient's ability to progress to PLOF and address remaining functional goals. (see flow sheet as applicable)     Details if applicable:  assessment and re-assessment   23  50658 Neuromuscular Re-Education (timed):  improve balance, coordination, kinesthetic sense, posture, core stability and proprioception to improve patient's ability to develop conscious control of individual muscles and awareness of position of extremities in order to progress to PLOF and address remaining functional goals. (see flow sheet as applicable)     Details if applicable:  Core

## 2024-08-26 ENCOUNTER — HOSPITAL ENCOUNTER (OUTPATIENT)
Dept: PHYSICAL THERAPY | Facility: HOSPITAL | Age: 29
Setting detail: RECURRING SERIES
Discharge: HOME | End: 2024-08-29
Payer: COMMERCIAL

## 2024-08-26 PROCEDURE — 97110 THERAPEUTIC EXERCISES: CPT | Performed by: PHYSICAL MEDICINE & REHABILITATION

## 2024-08-26 PROCEDURE — 20560 NDL INSJ W/O NJX 1 OR 2 MUSC: CPT | Performed by: PHYSICAL MEDICINE & REHABILITATION

## 2024-08-26 PROCEDURE — 97032 APPL MODALITY 1+ESTIM EA 15: CPT | Performed by: PHYSICAL MEDICINE & REHABILITATION

## 2024-08-26 PROCEDURE — 97016 VASOPNEUMATIC DEVICE THERAPY: CPT | Performed by: PHYSICAL MEDICINE & REHABILITATION

## 2024-09-04 ENCOUNTER — HOSPITAL ENCOUNTER (OUTPATIENT)
Dept: PHYSICAL THERAPY | Facility: HOSPITAL | Age: 29
Setting detail: RECURRING SERIES
Discharge: HOME | End: 2024-09-07
Payer: COMMERCIAL

## 2024-09-04 PROCEDURE — G0283 ELEC STIM OTHER THAN WOUND: HCPCS | Performed by: PHYSICAL MEDICINE & REHABILITATION

## 2024-09-04 PROCEDURE — 97110 THERAPEUTIC EXERCISES: CPT | Performed by: PHYSICAL MEDICINE & REHABILITATION

## 2024-09-04 PROCEDURE — 97112 NEUROMUSCULAR REEDUCATION: CPT | Performed by: PHYSICAL MEDICINE & REHABILITATION

## 2024-09-04 NOTE — PROGRESS NOTES
Physical Therapy at [facility],   a part of [address], [address]  [place] [address]  Phone: [phone]  Fax: [phone]      PHYSICAL THERAPY PROGRESS NOTE  Patient Name:  Elizabeth Love :  1995   Treatment/Medical Diagnosis: Low back pain [M54.50]   Referral Source:  Referral, Self     Date of Initial Visit:  24 Attended Visits:  9 Missed Visits:  0     SUMMARY OF TREATMENT/ASSESSMENT:  Ms. Love has been seen for 5 skilled physical therapy visits secondary to chronic low back pain. Pt is continuing to report localized central low back pain with lumbar extension AROM and notes relatively constant low back pain, though decreased intensity. Pain is most pronounced first thing in the mornings, in the evenings, and with reaching down with rotation. She also notes her hip pain has significantly improved and is experiencing intermittent muscle soreness versus pain. Gluteal strength and core stability are improving, though she would benefit from additional therapy to further improve functional and dynamic stabilization to further decrease pain and minimize future risk of injury.    CURRENT STATUS/GOALS:  FOTO score: 67/100 (57 on eval)     TTP L5 R>L transverse/spinous processes, S1 and S2 spinous processes    Lumbar AROM  Flexion: 100%   Hip extension: 50-75% central LBP   R SB: 75% mild LBP              L SB: 75% mild LBP  R rotation:  100%        L rotation: 100%     Hip abduction MMT: R: <3 --> 4+ L: 4-  5     Prone plank: 42\" fatigue    Short and Long Term Goals: To be accomplished in 20 treatments.  Pt will be independent and compliant with HEP. - MET  Pt will increase her FOTO score by the MCID from 57 to >/= 71 demonstrating improved overall function with decreased pain. - Progressing (67)  Pt will be able to be able to hold a prone plank >/= 1 minute with neutral spine and without losing form. - Progressing (able to hold

## 2024-09-04 NOTE — PROGRESS NOTES
PHYSICAL THERAPY - DAILY TREATMENT NOTE (updated 3/23)      Date: 2024          Patient Name:  Elizabeth Love :  1995   Medical   Diagnosis:  Low back pain [M54.50] Treatment Diagnosis:  M54.59, G89.29  CHRONIC LOWER BACK PAIN    Referral Source:  Referral, Self Insurance:   Payor: [facility] / Plan: [employer] [facility] [employer] / Product Type: *No Product type* /                     Patient  verified yes     Visit #   Current  / Total 9 20   Time   In / Out 10:47A 11:59A   Total Treatment Time 72   Total Timed Codes 57         SUBJECTIVE    Pain Level (0-10 scale): 3-4    Any medication changes, allergies to medications, adverse drug reactions, diagnosis change, or new procedure performed?: [x] No    [] Yes (see summary sheet for update)  Medications: Verified on Patient Summary List    Subjective functional status/changes:     Pt reports she has been a lot better than last week. No hip pain this week, just muscle soreness. The low back has been better, about 3-4/10 in general, worse first thing in the morning, at end of days and with reaching down with rotation. Back pain is also waking her up occasionally at night.    OBJECTIVE    Therapeutic Procedures:  Tx Min Billable or 1:1 Min (if diff from Tx Min) Procedure, Rationale, Specifics   32  [id number] Therapeutic Exercise (timed):  increase ROM, strength, coordination, balance, and proprioception to improve patient's ability to progress to PLOF and address remaining functional goals. (see flow sheet as applicable)     Details if applicable:  assessment of current status          25  05659 Neuromuscular Re-Education (timed):  improve balance, coordination, kinesthetic sense, posture, core stability and proprioception to improve patient's ability to develop conscious control of individual muscles and awareness of position of extremities in order to progress to PLOF and address remaining functional goals. (see flow sheet as applicable)    Details if applicable:  SL core

## 2024-09-10 ENCOUNTER — HOSPITAL ENCOUNTER (OUTPATIENT)
Dept: PHYSICAL THERAPY | Facility: HOSPITAL | Age: 29
Setting detail: RECURRING SERIES
Discharge: HOME | End: 2024-09-13
Payer: COMMERCIAL

## 2024-09-10 PROCEDURE — 97110 THERAPEUTIC EXERCISES: CPT | Performed by: PHYSICAL MEDICINE & REHABILITATION

## 2024-09-10 PROCEDURE — G0283 ELEC STIM OTHER THAN WOUND: HCPCS | Performed by: PHYSICAL MEDICINE & REHABILITATION

## 2024-09-10 PROCEDURE — 97112 NEUROMUSCULAR REEDUCATION: CPT | Performed by: PHYSICAL MEDICINE & REHABILITATION

## 2024-09-19 ENCOUNTER — HOSPITAL ENCOUNTER (OUTPATIENT)
Dept: PHYSICAL THERAPY | Facility: HOSPITAL | Age: 29
Setting detail: RECURRING SERIES
Discharge: HOME | End: 2024-09-22
Payer: COMMERCIAL

## 2024-09-19 PROCEDURE — 97112 NEUROMUSCULAR REEDUCATION: CPT | Performed by: PHYSICAL MEDICINE & REHABILITATION

## 2024-09-19 PROCEDURE — 97110 THERAPEUTIC EXERCISES: CPT | Performed by: PHYSICAL MEDICINE & REHABILITATION

## 2024-09-19 PROCEDURE — 97140 MANUAL THERAPY 1/> REGIONS: CPT | Performed by: PHYSICAL MEDICINE & REHABILITATION

## 2024-09-30 ENCOUNTER — HOSPITAL ENCOUNTER (OUTPATIENT)
Dept: PHYSICAL THERAPY | Facility: HOSPITAL | Age: 29
Setting detail: RECURRING SERIES
Discharge: HOME | End: 2024-10-03
Payer: COMMERCIAL

## 2024-09-30 PROCEDURE — 97140 MANUAL THERAPY 1/> REGIONS: CPT | Performed by: PHYSICAL MEDICINE & REHABILITATION

## 2024-09-30 PROCEDURE — 97112 NEUROMUSCULAR REEDUCATION: CPT | Performed by: PHYSICAL MEDICINE & REHABILITATION

## 2024-09-30 PROCEDURE — 97110 THERAPEUTIC EXERCISES: CPT | Performed by: PHYSICAL MEDICINE & REHABILITATION

## 2024-09-30 NOTE — PROGRESS NOTES
(timed):  decrease pain, increase ROM, and increase tissue extensibility to improve patient's ability to progress to PLOF and address remaining functional goals.  The manual therapy interventions were performed at a separate and distinct time from the therapeutic activities interventions . (see flow sheet as applicable)    Details if applicable:  IASTM and MFR along bilateral lumbar PS and multifidus musculature               40     Total Total         [x]  Patient Education billed concurrently with other procedures   [x] Review HEP    [] Progressed/Changed HEP, detail:    [] Other detail:         Other Objective/Functional Measures  --       Pain Level at end of session (0-10 scale): 0    Assessment  Challenged static and dynamic core stabilization throughout session today, particularly through rotational movements which Pt tolerated well.   Patient will continue to benefit from skilled PT / OT services to modify and progress therapeutic interventions, analyze and address functional mobility deficits, analyze and address ROM deficits, analyze and address strength deficits, analyze and address soft tissue restrictions, analyze and cue for proper movement patterns, analyze and modify for postural abnormalities, and instruct in home and community integration to address functional deficits and attain remaining goals.    Progress toward goals / Updated goals:  []  See Progress Note/Recertification    Short and Long Term Goals: To be accomplished in 20 treatments.  Pt will be independent and compliant with HEP. - MET  Pt will increase her FOTO score by the MCID from 57 to >/= 71 demonstrating improved overall function with decreased pain. - Progressing (67)  Pt will be able to be able to hold a prone plank >/= 1 minute with neutral spine and without losing form. - Progressing (able to hold modified)  Pt will demonstrate >/= 4/5 hip abduction strength bilaterally to facilitate improved standing activity tolerance. -

## 2024-10-17 ENCOUNTER — HOSPITAL ENCOUNTER (OUTPATIENT)
Dept: PHYSICAL THERAPY | Facility: HOSPITAL | Age: 29
Setting detail: RECURRING SERIES
Discharge: HOME | End: 2024-10-20
Payer: COMMERCIAL

## 2024-10-17 PROCEDURE — 97110 THERAPEUTIC EXERCISES: CPT | Performed by: PHYSICAL MEDICINE & REHABILITATION

## 2024-10-17 PROCEDURE — G0283 ELEC STIM OTHER THAN WOUND: HCPCS | Performed by: PHYSICAL MEDICINE & REHABILITATION

## 2024-10-17 NOTE — PROGRESS NOTES
Physical Therapy at [facility],   a part of [address], [address]  [place] [address]  Phone: [phone]  Fax: [phone]      DISCHARGE SUMMARY  Patient Name: Elizabeth Love : 1995   Treatment/Medical Diagnosis: Low back pain [M54.50]   Referral Source: Referral, Self     Date of Initial Visit: 24 Attended Visits: 13 Missed Visits: 0     SUMMARY OF TREATMENT  Ms. Love was seen for a total of 13 skilled physical therapy visits secondary to chronic LBP. Pt demonstrated excellent progress with her therapy program and reports feeling 95% improved since evaluation. Pt demonstrates significantly increased hip strength bilaterally as well as much improved core stabilization. She is able to walk long distances without pain and has been able to reintegrate into [employer] and yoga without pain. Pt is discharged from PT and has been provided a comprehensive HEP to further progress independently. Thank you for this referral!    CURRENT STATUS    FOTO Score: 82/100 (57 on eval)  0-100 improved: 95% improved                                         Lumbar AROM:                                                                                               R                      L                        Flexion                                                 100%                                         Extension                                            50% central LBP    100%                                           Side Bending                           100% L stretch      100%                  100% R stretch             100%            Rotation                                   100%               100%                    MMT:               HIP Ext: 5 MENDEZ              HIP Abd: R: <3 5; L: 3 5       Short and Long Term Goals: To be accomplished in 20 treatments.  Pt will be independent and compliant with HEP. - MET  Pt will increase her FOTO

## 2024-10-17 NOTE — PROGRESS NOTES
PHYSICAL THERAPY - DAILY TREATMENT NOTE (updated 3/23)      Date: 10/17/2024          Patient Name:  Elizabeth Love :  1995   Medical   Diagnosis:  Low back pain [M54.50] Treatment Diagnosis:  M54.59, G89.29  CHRONIC LOWER BACK PAIN    Referral Source:  Referral, Self Insurance:   Payor: [facility] / Plan: [employer] EM[employer] / Product Type: *No Product type* /                     Patient  verified yes     Visit #   Current  / Total 13 20   Time   In / Out 11:15A 12:10P   Total Treatment Time 55   Total Timed Codes 40         SUBJECTIVE    Pain Level (0-10 scale): 2-3 hip    Any medication changes, allergies to medications, adverse drug reactions, diagnosis change, or new procedure performed?: [x] No    [] Yes (see summary sheet for update)  Medications: Verified on Patient Summary List    Subjective functional status/changes:     Pt reports she has been feeling great, no pain at all over the past 2 weeks.    OBJECTIVE    Therapeutic Procedures:  Tx Min Billable or 1:1 Min (if diff from Tx Min) Procedure, Rationale, Specifics   40  [id number] Therapeutic Exercise (timed):  increase ROM, strength, coordination, balance, and proprioception to improve patient's ability to progress to PLOF and address remaining functional goals. (see flow sheet as applicable)     Details if applicable:                         40     Total Total         [x]  Patient Education billed concurrently with other procedures   [x] Review HEP    [] Progressed/Changed HEP, detail:    [] Other detail:         Other Objective/Functional Measures  FOTO Score: 82/100 (57 on eval)  0-100 improved: 95% improved                                        Lumbar AROM:                                                                                               R                      L                        Flexion                                                 100%                                         Extension

## (undated) DEVICE — INSUFFLATION NEEDLE TO ESTABLISH PNEUMOPERITONEUM.: Brand: INSUFFLATION NEEDLE

## (undated) DEVICE — TROCAR: Brand: KII FIOS FIRST ENTRY

## (undated) DEVICE — SYSTEM EVAC SMOKE LAPARSCOPIC

## (undated) DEVICE — SOLUTION IRRIG 1000ML 0.9% SOD CHL USP POUR PLAS BTL

## (undated) DEVICE — SUTURE SZ 0 27IN 5/8 CIR UR-6  TAPER PT VIOLET ABSRB VICRYL J603H

## (undated) DEVICE — SYRINGE MED 10ML TRNSLUC BRL PLUNG BLK MRK POLYPR CTRL

## (undated) DEVICE — TROCAR: Brand: KII SLEEVE

## (undated) DEVICE — GLOVE SURG SZ 65 L12IN FNGR THK94MIL STD WHT LTX FREE

## (undated) DEVICE — PAD PT POS 36 IN SURGYPAD DISP

## (undated) DEVICE — TROCAR: Brand: KII OPTICAL ACCESS SYSTEM

## (undated) DEVICE — SUTURE MCRYL SZ 4-0 L27IN ABSRB UD L19MM PS-2 1/2 CIR PRIM Y426H

## (undated) DEVICE — GYN LAPAROSCOPY - SMH: Brand: MEDLINE INDUSTRIES, INC.